# Patient Record
Sex: MALE | Race: WHITE
[De-identification: names, ages, dates, MRNs, and addresses within clinical notes are randomized per-mention and may not be internally consistent; named-entity substitution may affect disease eponyms.]

---

## 2018-07-03 ENCOUNTER — HOSPITAL ENCOUNTER (EMERGENCY)
Dept: HOSPITAL 31 - C.ER | Age: 38
LOS: 1 days | Discharge: LEFT BEFORE DISCHARGE | End: 2018-07-04
Payer: COMMERCIAL

## 2018-07-03 VITALS — DIASTOLIC BLOOD PRESSURE: 85 MMHG | SYSTOLIC BLOOD PRESSURE: 127 MMHG | HEART RATE: 75 BPM | RESPIRATION RATE: 16 BRPM

## 2018-07-03 VITALS — OXYGEN SATURATION: 100 %

## 2018-07-03 VITALS — TEMPERATURE: 98.1 F

## 2018-07-03 DIAGNOSIS — R07.89: Primary | ICD-10-CM

## 2018-07-03 DIAGNOSIS — F17.210: ICD-10-CM

## 2018-07-03 DIAGNOSIS — E11.9: ICD-10-CM

## 2018-07-03 LAB
ALBUMIN SERPL-MCNC: 4.6 G/DL (ref 3.5–5)
ALBUMIN/GLOB SERPL: 1.6 {RATIO} (ref 1–2.1)
ALT SERPL-CCNC: 31 U/L (ref 21–72)
APTT BLD: 31 SECONDS (ref 21–34)
AST SERPL-CCNC: 29 U/L (ref 17–59)
BASOPHILS # BLD AUTO: 0.1 K/UL (ref 0–0.2)
BASOPHILS NFR BLD: 0.7 % (ref 0–2)
BILIRUB UR-MCNC: NEGATIVE MG/DL
BNP SERPL-MCNC: 20 PG/ML (ref 0–450)
BUN SERPL-MCNC: 11 MG/DL (ref 9–20)
CALCIUM SERPL-MCNC: 9.5 MG/DL (ref 8.6–10.4)
EOSINOPHIL # BLD AUTO: 0.1 K/UL (ref 0–0.7)
EOSINOPHIL NFR BLD: 1.3 % (ref 0–4)
ERYTHROCYTE [DISTWIDTH] IN BLOOD BY AUTOMATED COUNT: 13.7 % (ref 11.5–14.5)
GFR NON-AFRICAN AMERICAN: > 60
GLUCOSE UR STRIP-MCNC: NORMAL MG/DL
HGB BLD-MCNC: 15 G/DL (ref 12–18)
INR PPP: 1
LEUKOCYTE ESTERASE UR-ACNC: (no result) LEU/UL
LYMPHOCYTES # BLD AUTO: 3.2 K/UL (ref 1–4.3)
LYMPHOCYTES NFR BLD AUTO: 30.7 % (ref 20–40)
MCH RBC QN AUTO: 27.4 PG (ref 27–31)
MCHC RBC AUTO-ENTMCNC: 32.8 G/DL (ref 33–37)
MCV RBC AUTO: 83.7 FL (ref 80–94)
MONOCYTES # BLD: 0.7 K/UL (ref 0–0.8)
MONOCYTES NFR BLD: 6.3 % (ref 0–10)
NEUTROPHILS # BLD: 6.5 K/UL (ref 1.8–7)
NEUTROPHILS NFR BLD AUTO: 61 % (ref 50–75)
NRBC BLD AUTO-RTO: 0.1 % (ref 0–2)
PH UR STRIP: 6 [PH] (ref 5–8)
PLATELET # BLD: 217 K/UL (ref 130–400)
PMV BLD AUTO: 9 FL (ref 7.2–11.7)
PROT UR STRIP-MCNC: NEGATIVE MG/DL
PROTHROMBIN TIME: 11.3 SECONDS (ref 9.7–12.2)
RBC # BLD AUTO: 5.47 MIL/UL (ref 4.4–5.9)
RBC # UR STRIP: NEGATIVE /UL
SP GR UR STRIP: 1.02 (ref 1–1.03)
UROBILINOGEN UR-MCNC: NORMAL MG/DL (ref 0.2–1)
WBC # BLD AUTO: 10.6 K/UL (ref 4.8–10.8)

## 2018-07-03 PROCEDURE — 80324 DRUG SCREEN AMPHETAMINES 1/2: CPT

## 2018-07-03 PROCEDURE — 84484 ASSAY OF TROPONIN QUANT: CPT

## 2018-07-03 PROCEDURE — 85730 THROMBOPLASTIN TIME PARTIAL: CPT

## 2018-07-03 PROCEDURE — 80053 COMPREHEN METABOLIC PANEL: CPT

## 2018-07-03 PROCEDURE — 80345 DRUG SCREENING BARBITURATES: CPT

## 2018-07-03 PROCEDURE — 71275 CT ANGIOGRAPHY CHEST: CPT

## 2018-07-03 PROCEDURE — 83880 ASSAY OF NATRIURETIC PEPTIDE: CPT

## 2018-07-03 PROCEDURE — 80349 CANNABINOIDS NATURAL: CPT

## 2018-07-03 PROCEDURE — 81001 URINALYSIS AUTO W/SCOPE: CPT

## 2018-07-03 PROCEDURE — 86850 RBC ANTIBODY SCREEN: CPT

## 2018-07-03 PROCEDURE — 80353 DRUG SCREENING COCAINE: CPT

## 2018-07-03 PROCEDURE — 80358 DRUG SCREENING METHADONE: CPT

## 2018-07-03 PROCEDURE — 80361 OPIATES 1 OR MORE: CPT

## 2018-07-03 PROCEDURE — 71045 X-RAY EXAM CHEST 1 VIEW: CPT

## 2018-07-03 PROCEDURE — 85610 PROTHROMBIN TIME: CPT

## 2018-07-03 PROCEDURE — 85025 COMPLETE CBC W/AUTO DIFF WBC: CPT

## 2018-07-03 PROCEDURE — 86900 BLOOD TYPING SEROLOGIC ABO: CPT

## 2018-07-03 PROCEDURE — 83992 ASSAY FOR PHENCYCLIDINE: CPT

## 2018-07-03 PROCEDURE — 80346 BENZODIAZEPINES1-12: CPT

## 2018-07-03 NOTE — C.PDOC
History Of Present Illness


37 years old male presents to ED for complaints of sudden left chest wall pain 

that began earlier today while sitting at home and watching a soccer game. 

Patient states he felt diaphoretic. Patient also reports he has been smoking 

more cigarettes the last few days due to personal issues. Denies fever, chills, 

nausea, or vomiting. 


Time Seen by Provider: 07/03/18 19:13


Chief Complaint (Nursing): Chest Pain


History Per: Patient


History/Exam Limitations: no limitations


Onset/Duration Of Symptoms: Hrs


Current Symptoms Are (Timing): Still Present


Severity: Moderate


Pain Scale Rating Of: 4


Quality: "Pain"


Associated Symptoms: denies: Nausea, Dyspnea, Syncope


Modifying Factors: None


Exacerbating Factors: None


Alleviating Factors: None


Recent travel outside of the [place]: No


Additional History Per: Patient





Past Medical History


Reviewed: Historical Data, Nursing Documentation, Vital Signs


Vital Signs: 


 Last Vital Signs











Temp  98.1 F   07/03/18 18:58


 


Pulse  75   07/03/18 22:29


 


Resp  16   07/03/18 22:29


 


BP  127/85   07/03/18 22:29


 


Pulse Ox  99   07/03/18 22:29














- Medical History


PMH: Diabetes


Family History: States: No Known Family Hx





- Social History


Hx Alcohol Use: Yes


Hx Substance Use: No





- Immunization History


Hx Tetanus Toxoid Vaccination: No


Hx Influenza Vaccination: No


Hx Pneumococcal Vaccination: No





Review Of Systems


Constitutional: Negative for: Fever, Chills


Eyes: Negative for: Redness


Cardiovascular: Positive for: Chest Pain (Left chest wall)


Respiratory: Negative for: Shortness of Breath


Gastrointestinal: Negative for: Nausea, Vomiting, Abdominal Pain, Diarrhea


Musculoskeletal: Negative for: Back Pain


Skin: Negative for: Rash


Neurological: Negative for: Weakness, Numbness


Psych: Negative for: Anxiety





Physical Exam





- Physical Exam


Appears: Non-toxic, No Acute Distress, Other (Patient is currently chest pain 

free)


Skin: Warm, Dry


Head: Normacephalic


Eye(s): bilateral: Normal Inspection


Oral Mucosa: Moist


Neck: Trachea Midline, Supple


Chest: Symmetrical, No Tenderness


Cardiovascular: Rhythm Regular, No Murmur


Respiratory: No Decreased Breath Sounds, No Rales, No Rhonchi, No Wheezing


Gastrointestinal/Abdominal: Soft, No Tenderness, No Distention


Back: Normal Inspection


Extremity: Normal ROM


Extremity: Bilateral: Atraumatic, Normal Color And Temperature, Normal ROM


Pulses: Left Dorsalis Pedis: Normal


Neurological/Psych: Oriented x3 (Awake and alert)


Gait: Steady





ED Course And Treatment





- Laboratory Results


Result Diagrams: 


 07/03/18 19:29





 07/03/18 19:29


ECG: Interpreted By Me, Viewed By Me


ECG Rhythm: Sinus Rhythm (97), Nonspecific Changes


O2 Sat by Pulse Oximetry: 100 (RA)


Pulse Ox Interpretation: Normal





- Radiology


CXR: Interpreted by Me, Viewed By Me


Progress Note: Administered Aspirin and Brilinta.  Ordered BBK, EKG, blood work

, CXR, and urinalysis.  7:13PM:  - Discussed patient's EKG with Dr. Oconnor, who 

agrees that patient has code heart criteria.





Disposition


Discussed With Dr.: Angy Sommers


Comment: accepted the pt on  his service and took over the care at 11:49PM


Doctor Will See Patient In The: Hospital


Counseled Patient/Family Regarding: Studies Performed, Diagnosis





- Disposition


Disposition: HOSPITALIZED


Disposition Time: 19:21


Condition: FAIR


Forms:  [facility] (English)





- POA


Present On Arrival: None





- Clinical Impression


Clinical Impression: 


 Chest pain








- Scribe Statement


The provider has reviewed the documentation as recorded by the Scribe





Justus Mendez





All medical record entries made by the Scribe were at my direction and 

personally dictated by me. I have reviewed the chart and agree that the record 

accurately reflects my personal performance of the history, physical exam, 

medical decision making, and the department course for this patient. I have 

also personally directed, reviewed, and agree with the discharge instructions 

and disposition.





Decision To Admit





- Pt Status Changed To:


Hospital Disposition Of: Observation





- .


Bed Request Type: Telemetry


Admitting Physician: Angy Sommers


Patient Diagnosis: 


 Chest pain

## 2018-07-04 NOTE — CP.PCM.DIS
Provider





- Provider


Date of Admission: 


07/03/18 23:48





Attending physician: 


Angy Sommers MD





Time Spent in preparation of Discharge (in minutes): 3





Hospital Course





- Lab Results


Lab Results: 


 Most Recent Lab Values











WBC  10.6 K/uL (4.8-10.8)   07/03/18  19:29    


 


RBC  5.47 Mil/uL (4.40-5.90)   07/03/18  19:29    


 


Hgb  15.0 g/dL (12.0-18.0)   07/03/18  19:29    


 


Hct  45.8 % (35.0-51.0)   07/03/18  19:29    


 


MCV  83.7 fL (80.0-94.0)   07/03/18  19:29    


 


MCH  27.4 pg (27.0-31.0)   07/03/18  19:29    


 


MCHC  32.8 g/dL (33.0-37.0)  L  07/03/18  19:29    


 


RDW  13.7 % (11.5-14.5)   07/03/18  19:29    


 


Plt Count  217 K/uL (130-400)   07/03/18  19:29    


 


MPV  9.0 fL (7.2-11.7)   07/03/18  19:29    


 


Neut % (Auto)  61.0 % (50.0-75.0)   07/03/18  19:29    


 


Lymph % (Auto)  30.7 % (20.0-40.0)   07/03/18  19:29    


 


Mono % (Auto)  6.3 % (0.0-10.0)   07/03/18  19:29    


 


Eos % (Auto)  1.3 % (0.0-4.0)   07/03/18  19:29    


 


Baso % (Auto)  0.7 % (0.0-2.0)   07/03/18 19:29    


 


Neut # (Auto)  6.5 K/uL (1.8-7.0)   07/03/18  19:29    


 


Lymph # (Auto)  3.2 K/uL (1.0-4.3)   07/03/18  19:29    


 


Mono # (Auto)  0.7 K/uL (0.0-0.8)   07/03/18  19:29    


 


Eos # (Auto)  0.1 K/uL (0.0-0.7)   07/03/18  19:29    


 


Baso # (Auto)  0.1 K/uL (0.0-0.2)   07/03/18  19:29    


 


PT  11.3 SECONDS (9.7-12.2)   07/03/18  19:29    


 


INR  1.0   07/03/18  19:29    


 


APTT  31 SECONDS (21-34)   07/03/18  19:29    


 


Sodium  142 mmol/L (132-148)   07/03/18  19:29    


 


Potassium  3.7 mmol/L (3.6-5.2)   07/03/18  19:29    


 


Chloride  101 mmol/L ()   07/03/18  19:29    


 


Carbon Dioxide  26 mmol/L (22-30)   07/03/18  19:29    


 


Anion Gap  18  (10-20)   07/03/18  19:29    


 


BUN  11 mg/dL (9-20)   07/03/18  19:29    


 


Creatinine  0.9 mg/dL (0.8-1.5)   07/03/18  19:29    


 


Est GFR ( Amer)  > 60   07/03/18  19:29    


 


Est GFR (Non-Af Amer)  > 60   07/03/18  19:29    


 


Random Glucose  133 mg/dL ()  H  07/03/18  19:29    


 


Calcium  9.5 mg/dl (8.6-10.4)   07/03/18  19:29    


 


Total Bilirubin  0.8 mg/dL (0.2-1.3)   07/03/18  19:29    


 


AST  29 U/L (17-59)   07/03/18  19:29    


 


ALT  31 U/L (21-72)   07/03/18  19:29    


 


Alkaline Phosphatase  41 U/L ()   07/03/18  19:29    


 


Troponin I  < 0.0120 ng/mL (0.00-0.120)   07/03/18  19:29    


 


NT-Pro-B Natriuret Pep  20.0 pg/mL (0-450)   07/03/18  19:29    


 


Total Protein  7.4 g/dL (6.3-8.3)   07/03/18  19:29    


 


Albumin  4.6 g/dL (3.5-5.0)   07/03/18  19:29    


 


Globulin  2.8 gm/dL (2.2-3.9)   07/03/18  19:29    


 


Albumin/Globulin Ratio  1.6  (1.0-2.1)   07/03/18  19:29    


 


Urine Color  Yellow  (YELLOW)   07/03/18  20:27    


 


Urine Clarity  Clear  (Clear)   07/03/18  20:27    


 


Urine pH  6.0  (5.0-8.0)   07/03/18  20:27    


 


Ur Specific Gravity  1.018  (1.003-1.030)   07/03/18  20:27    


 


Urine Protein  Negative mg/dL (NEGATIVE)   07/03/18  20:27    


 


Urine Glucose (UA)  Normal mg/dL (Normal)   07/03/18  20:27    


 


Urine Ketones  Negative mg/dL (NEGATIVE)   07/03/18  20:27    


 


Urine Blood  Negative  (NEGATIVE)   07/03/18  20:27    


 


Urine Nitrate  Negative  (NEGATIVE)   07/03/18  20:27    


 


Urine Bilirubin  Negative  (NEGATIVE)   07/03/18  20:27    


 


Urine Urobilinogen  Normal mg/dL (0.2-1.0)   07/03/18  20:27    


 


Ur Leukocyte Esterase  Neg Kia/uL (Negative)   07/03/18  20:27    


 


Urine WBC (Auto)  < 1 /hpf (0-5)   07/03/18  20:27    


 


Urine RBC (Auto)  2 /hpf (0-3)   07/03/18  20:27    


 


Urine Opiates Screen  Negative  (NEGATIVE)   07/03/18  20:27    


 


Urine Methadone Screen  Negative  (NEGATIVE)   07/03/18  20:27    


 


Ur Barbiturates Screen  Negative  (NEGATIVE)   07/03/18  20:27    


 


Ur Phencyclidine Scrn  Negative  (NEGATIVE)   07/03/18  20:27    


 


Ur Amphetamines Screen  Negative  (NEGATIVE)   07/03/18  20:27    


 


U Benzodiazepines Scrn  Negative  (NEGATIVE)   07/03/18  20:27    


 


U Oth Cocaine Metabols  Negative  (NEGATIVE)   07/03/18  20:27    


 


U Cannabinoids Screen  Negative  (NEGATIVE)   07/03/18  20:27    


 


Blood Type  A POSITIVE   07/03/18  19:29    


 


Antibody Screen  Negative   07/03/18  19:29    














- Hospital Course


Hospital Course: 





PATIENT WAS ADMITTED WITH CHEST PAIN>EKG cardiac enzymeswere negative.


CT angiography of the chest did not reveal any pulmonary embolism.


Patient signed out AGAINST MEDICAL ADVICE will contact patient for any further 

evaluation.





Discharge Plan





- Follow Up Plan


Condition: FAIR


Disposition: AGAINST MEDICAL ADVICE


Instructions:  Chest Pain


Additional Instructions: 


Please return if symptoms recur


Referrals: 


 Service [Outside]


[facility] [Outside]

## 2018-07-04 NOTE — CT
PROCEDURE:  CT Chest with contrast (Pulmonary Angiogram)



HISTORY:

cp, sob



COMPARISON:

None available. 



TECHNIQUE:

Axial computed tomography images were obtained of the chest in the 

pulmonary arterial phase of enhancement. Coronal and sagittal 

reformatted images were created and reviewed.



Intravenous contrast dose: 100 mL Visipaque 320



Radiation dose:



Total exam DLP = 449.2 mGy-cm.



This CT exam was performed using one or more of the following dose 

reduction techniques: Automated exposure control, adjustment of the 

mA and/or kV according to patient size, and/or use of iterative 

reconstruction technique.



FINDINGS:



PULMONARY ARTERIES:

Unremarkable. No pulmonary embolism. 



AORTA:

No acute findings. Common origin of the brachiocephalic and left 

common carotid arteries. No thoracic aortic aneurysm. 



LUNGS:

Unremarkable. No nodule, mass or pulmonary consolidation. 



PLEURAL SPACES:

Unremarkable. No effusion or pneuomothorax. 



HEART:

Unremarkable. No cardiomegaly. No significant pericardial effusion. 



LYMPH NODES:

No lymphadenopathy.



BONES, CHEST WALL:

Unremarkable. No fracture or destructive lesion 



OTHER FINDINGS:

Unremarkable. 



IMPRESSION:

Unremarkable CT pulmonary angiogram. No pulmonary embolus.

## 2018-07-04 NOTE — CP.PCM.HP
History of Present Illness





- History of Present Illness


History of Present Illness: 





PATIENT WAS ADMITTED [facility] ATYPICAL CHEST PAIN WHI SOCCER GAME ASSOCIATED WITH 

DIAPHORESIS.


cARDIAC WORKUP IN THE EMERGENCY ROOM WAS NEGATIVE FOR ANY ACUTE ISCHEMIA.


CT chest angiography did not reveal any pulmonary embolism. 





patient signed out AGAINST MED PATIENT COULD BE EXAM





Present on Admission





- Present on Admission


Any Indicators Present on Admission: No





Past Patient History





- Infectious Disease


Hx of Infectious Diseases: None





- Past Social History


Smoking Status: Light Smoker < 10 Cigarettes Daily





- ENDOCRINE/METABOLIC


Hx Diabetes Mellitus Type 2: Yes





- PSYCHIATRIC


Hx Substance Use: No





- SURGICAL HISTORY


Hx Surgeries: Yes


Hx Orthopedic Surgery: Yes





- ANESTHESIA


Hx Anesthesia: Yes


Hx Anesthesia Reactions: No





Meds


Allergies/Adverse Reactions: 


 Allergies











Allergy/AdvReac Type Severity Reaction Status Date / Time


 


amoxicillin Allergy   Verified 07/03/18 18:57














Results





- Vital Signs


Recent Vital Signs: 





 Last Vital Signs











Temp  98.1 F   07/03/18 18:58


 


Pulse  75   07/03/18 22:29


 


Resp  16   07/03/18 22:29


 


BP  127/85   07/03/18 22:29


 


Pulse Ox  100   07/03/18 23:53














- Labs


Result Diagrams: 


 07/03/18 19:29





 07/03/18 19:29


Labs: 





 Laboratory Results - last 24 hr











  07/03/18 07/03/18 07/03/18





  19:29 19:29 19:29


 


WBC  10.6  


 


RBC  5.47  


 


Hgb  15.0  


 


Hct  45.8  


 


MCV  83.7  


 


MCH  27.4  


 


MCHC  32.8 L  


 


RDW  13.7  


 


Plt Count  217  


 


MPV  9.0  


 


Neut % (Auto)  61.0  


 


Lymph % (Auto)  30.7  


 


Mono % (Auto)  6.3  


 


Eos % (Auto)  1.3  


 


Baso % (Auto)  0.7  


 


Neut # (Auto)  6.5  


 


Lymph # (Auto)  3.2  


 


Mono # (Auto)  0.7  


 


Eos # (Auto)  0.1  


 


Baso # (Auto)  0.1  


 


PT   11.3 


 


INR   1.0 


 


APTT   31 


 


Sodium    142


 


Potassium    3.7


 


Chloride    101


 


Carbon Dioxide    26


 


Anion Gap    18


 


BUN    11


 


Creatinine    0.9


 


Est GFR ( Amer)    > 60


 


Est GFR (Non-Af Amer)    > 60


 


Random Glucose    133 H


 


Calcium    9.5


 


Total Bilirubin    0.8


 


AST    29


 


ALT    31


 


Alkaline Phosphatase    41


 


Troponin I   


 


NT-Pro-B Natriuret Pep   


 


Total Protein    7.4


 


Albumin    4.6


 


Globulin    2.8


 


Albumin/Globulin Ratio    1.6


 


Urine Color   


 


Urine Clarity   


 


Urine pH   


 


Ur Specific Gravity   


 


Urine Protein   


 


Urine Glucose (UA)   


 


Urine Ketones   


 


Urine Blood   


 


Urine Nitrate   


 


Urine Bilirubin   


 


Urine Urobilinogen   


 


Ur Leukocyte Esterase   


 


Urine WBC (Auto)   


 


Urine RBC (Auto)   


 


Urine Opiates Screen   


 


Urine Methadone Screen   


 


Ur Barbiturates Screen   


 


Ur Phencyclidine Scrn   


 


Ur Amphetamines Screen   


 


U Benzodiazepines Scrn   


 


U Oth Cocaine Metabols   


 


U Cannabinoids Screen   


 


Blood Type   


 


Antibody Screen   














  07/03/18 07/03/18 07/03/18





  19:29 19:29 20:27


 


WBC   


 


RBC   


 


Hgb   


 


Hct   


 


MCV   


 


MCH   


 


MCHC   


 


RDW   


 


Plt Count   


 


MPV   


 


Neut % (Auto)   


 


Lymph % (Auto)   


 


Mono % (Auto)   


 


Eos % (Auto)   


 


Baso % (Auto)   


 


Neut # (Auto)   


 


Lymph # (Auto)   


 


Mono # (Auto)   


 


Eos # (Auto)   


 


Baso # (Auto)   


 


PT   


 


INR   


 


APTT   


 


Sodium   


 


Potassium   


 


Chloride   


 


Carbon Dioxide   


 


Anion Gap   


 


BUN   


 


Creatinine   


 


Est GFR ( Amer)   


 


Est GFR (Non-Af Amer)   


 


Random Glucose   


 


Calcium   


 


Total Bilirubin   


 


AST   


 


ALT   


 


Alkaline Phosphatase   


 


Troponin I   < 0.0120 


 


NT-Pro-B Natriuret Pep   20.0 


 


Total Protein   


 


Albumin   


 


Globulin   


 


Albumin/Globulin Ratio   


 


Urine Color    Yellow


 


Urine Clarity    Clear


 


Urine pH    6.0


 


Ur Specific Gravity    1.018


 


Urine Protein    Negative


 


Urine Glucose (UA)    Normal


 


Urine Ketones    Negative


 


Urine Blood    Negative


 


Urine Nitrate    Negative


 


Urine Bilirubin    Negative


 


Urine Urobilinogen    Normal


 


Ur Leukocyte Esterase    Neg


 


Urine WBC (Auto)    < 1


 


Urine RBC (Auto)    2


 


Urine Opiates Screen   


 


Urine Methadone Screen   


 


Ur Barbiturates Screen   


 


Ur Phencyclidine Scrn   


 


Ur Amphetamines Screen   


 


U Benzodiazepines Scrn   


 


U Oth Cocaine Metabols   


 


U Cannabinoids Screen   


 


Blood Type  A POSITIVE  


 


Antibody Screen  Negative  














  07/03/18





  20:27


 


WBC 


 


RBC 


 


Hgb 


 


Hct 


 


MCV 


 


MCH 


 


MCHC 


 


RDW 


 


Plt Count 


 


MPV 


 


Neut % (Auto) 


 


Lymph % (Auto) 


 


Mono % (Auto) 


 


Eos % (Auto) 


 


Baso % (Auto) 


 


Neut # (Auto) 


 


Lymph # (Auto) 


 


Mono # (Auto) 


 


Eos # (Auto) 


 


Baso # (Auto) 


 


PT 


 


INR 


 


APTT 


 


Sodium 


 


Potassium 


 


Chloride 


 


Carbon Dioxide 


 


Anion Gap 


 


BUN 


 


Creatinine 


 


Est GFR ( Amer) 


 


Est GFR (Non-Af Amer) 


 


Random Glucose 


 


Calcium 


 


Total Bilirubin 


 


AST 


 


ALT 


 


Alkaline Phosphatase 


 


Troponin I 


 


NT-Pro-B Natriuret Pep 


 


Total Protein 


 


Albumin 


 


Globulin 


 


Albumin/Globulin Ratio 


 


Urine Color 


 


Urine Clarity 


 


Urine pH 


 


Ur Specific Gravity 


 


Urine Protein 


 


Urine Glucose (UA) 


 


Urine Ketones 


 


Urine Blood 


 


Urine Nitrate 


 


Urine Bilirubin 


 


Urine Urobilinogen 


 


Ur Leukocyte Esterase 


 


Urine WBC (Auto) 


 


Urine RBC (Auto) 


 


Urine Opiates Screen  Negative


 


Urine Methadone Screen  Negative


 


Ur Barbiturates Screen  Negative


 


Ur Phencyclidine Scrn  Negative


 


Ur Amphetamines Screen  Negative


 


U Benzodiazepines Scrn  Negative


 


U Oth Cocaine Metabols  Negative


 


U Cannabinoids Screen  Negative


 


Blood Type 


 


Antibody Screen

## 2023-01-21 ENCOUNTER — HOSPITAL ENCOUNTER (EMERGENCY)
Age: 43
Discharge: HOME | End: 2023-01-21
Attending: EMERGENCY MEDICINE

## 2023-01-21 VITALS
TEMPERATURE: 97.5 F | SYSTOLIC BLOOD PRESSURE: 137 MMHG | DIASTOLIC BLOOD PRESSURE: 91 MMHG | HEART RATE: 82 BPM | OXYGEN SATURATION: 100 % | BODY MASS INDEX: 26.8 KG/M2 | WEIGHT: 176.81 LBS | RESPIRATION RATE: 18 BRPM | HEIGHT: 68 IN

## 2023-01-21 DIAGNOSIS — M54.40 ACUTE LEFT-SIDED LOW BACK PAIN WITH SCIATICA, SCIATICA LATERALITY UNSPECIFIED: Primary | ICD-10-CM

## 2023-01-21 PROCEDURE — 10004651 HB RX, NO CHARGE ITEM: Performed by: EMERGENCY MEDICINE

## 2023-01-21 PROCEDURE — 99283 EMERGENCY DEPT VISIT LOW MDM: CPT

## 2023-01-21 PROCEDURE — 99283 EMERGENCY DEPT VISIT LOW MDM: CPT | Performed by: EMERGENCY MEDICINE

## 2023-01-21 RX ORDER — CYCLOBENZAPRINE HCL 10 MG
10 TABLET ORAL 3 TIMES DAILY PRN
Qty: 15 TABLET | Refills: 0 | Status: SHIPPED | OUTPATIENT
Start: 2023-01-21

## 2023-01-21 RX ORDER — IBUPROFEN 800 MG/1
800 TABLET ORAL 3 TIMES DAILY PRN
Qty: 21 TABLET | Refills: 0 | Status: SHIPPED | OUTPATIENT
Start: 2023-01-21 | End: 2023-01-28

## 2023-01-21 RX ORDER — ACETAMINOPHEN 500 MG
1000 TABLET ORAL ONCE
Status: COMPLETED | OUTPATIENT
Start: 2023-01-21 | End: 2023-01-21

## 2023-01-21 RX ORDER — METHYLPREDNISOLONE 4 MG/1
4 TABLET ORAL SEE ADMIN INSTRUCTIONS
Qty: 21 TABLET | Refills: 0 | Status: SHIPPED | OUTPATIENT
Start: 2023-01-21

## 2023-01-21 RX ADMIN — ACETAMINOPHEN 1000 MG: 500 TABLET ORAL at 06:41

## 2023-01-21 ASSESSMENT — PAIN DESCRIPTION - PAIN TYPE: TYPE: ACUTE PAIN

## 2023-01-21 ASSESSMENT — PAIN SCALES - GENERAL: PAINLEVEL_OUTOF10: 7
